# Patient Record
Sex: FEMALE | Race: WHITE
[De-identification: names, ages, dates, MRNs, and addresses within clinical notes are randomized per-mention and may not be internally consistent; named-entity substitution may affect disease eponyms.]

---

## 2018-10-22 ENCOUNTER — HOSPITAL ENCOUNTER (OUTPATIENT)
Dept: HOSPITAL 96 - M.CRD | Age: 48
End: 2018-10-22
Payer: COMMERCIAL

## 2018-10-22 DIAGNOSIS — R47.9: ICD-10-CM

## 2018-10-22 DIAGNOSIS — G45.9: Primary | ICD-10-CM

## 2018-10-22 DIAGNOSIS — F41.9: ICD-10-CM

## 2018-10-22 DIAGNOSIS — I67.82: ICD-10-CM

## 2018-10-22 DIAGNOSIS — G43.911: ICD-10-CM

## 2018-10-22 DIAGNOSIS — I37.1: ICD-10-CM

## 2018-10-22 DIAGNOSIS — I07.1: ICD-10-CM

## 2018-10-22 DIAGNOSIS — I63.9: ICD-10-CM

## 2018-10-22 NOTE — 2DMMODE
[place] [address]
Phone:  [phone] 2 D/M-MODE ECHOCARDIOGRAM     
_______________________________________________________________________________
 
Name:         TEREZA SILVER                Room:                     REG CLI
M.R.#:    [id number]     Account #:     [id number]  
Admission:    10/22/18    Attend Phys:   Hakan Hester MD 
Discharge:                Date of Birth: [birth year]  
Date of Service: 10/22/18 1410  Report #:      [id number]
        [id number]
_______________________________________________________________________________
THIS REPORT FOR:  //name//                      
 
 
--------------- APPROVED REPORT --------------
 
 
Study performed:  10/22/2018 08:45:45
 
EXAM: Comprehensive 2D, Doppler, and color-flow 
Echocardiogram 
Patient Location: Out-Patient   
      Status:  routine
 
      BSA:         1.86
HR: 65 bpm BP:          116/60 mmHg 
 
Other Information 
Study Quality: Good
 
Indications
CVA/TIA 
Migraine
 
Echo Enhancing Agent
Indication: Rule out Shunt
Agent(s) / Amount(s) Used: Agitated Saline 10 cc
 
2D Dimensions
IVSd:  8.96 (7-11mm) LVOT Diam:  20.08 (18-24mm) 
LVDd:  43.78 mm  
PWd:  8.96 (7-11mm) Ascending Ao:  26.64 (22-36mm)
LVDs:  28.53 (25-40mm) 
Aortic Root:  25.30 mm 
 
Volumes
Left Atrial Volume (Systole) 
    LA ESV Index:  17.40 mL/m2
 
Aortic Valve
AoV Peak Marek.:  1.52 m/s 
AO Peak Gr.:  9.19 mmHg  LVOT Max P.15 mmHg
AO Mean Gr.:  4.70 mmHg  LVOT Mean P.63 mmHg
    LVOT Max V:  1.24 m/s
AO V2 VTI:  29.03 cm  LVOT Mean V:  0.72 m/s
JOSSELIN (VTI):  2.76 cm2  LVOT V1 VTI:  25.29 cm
 
 
 
[place] [address]
Phone:  [phone]                     2 D/M-MODE ECHOCARDIOGRAM     
_______________________________________________________________________________
 
Name:         TEREZA SILVER                Room:                     REG CLI
M.R.#:    [id number]     Account #:     [id number]  
Admission:    10/22/18    Attend Phys:   Hakan Hester MD 
Discharge:                Date of Birth: [birth year]  
Date of Service: 10/22/18 1410  Report #:      [id number]
        [id number]
_______________________________________________________________________________
Mitral Valve
    E/A Ratio:  1.18
    MV Decel. Time:  190.62 ms
MV E Max Marek.:  0.69 m/s 
MV PHT:  55.28 ms  
MVA (PHT):  3.98 cm2  
 
TDI
E/Lateral E':  3.83 E/Medial E':  6.27
   Medial E' Marek.:  0.11 m/s
   Lateral E' Marek.:  0.18 m/s
 
Pulmonary Valve
PV Peak Marek.:  0.89 m/s PV Peak Gr.:  3.18 mmHg
 
Tricuspid Valve
    RAP Estimate:  5.00 mmHg
TR Peak Gr.:  23.11 mmHg RVSP:  28.11 mmHg
    PA Pressure:  28.11 mmHg
 
Left Ventricle
The left ventricle is normal size. There is normal LV segmental wall 
motion. There is normal left ventricular wall thickness. Left 
ventricular systolic function is normal. The left ventricular 
ejection fraction is within the normal range. LVEF is 55-60%. The 
left ventricular diastolic function is normal.
 
Right Ventricle
The right ventricle is normal size. The right ventricular systolic 
function is normal.
 
Atria
The left atrium size is normal. Injection of bubbles documented no 
interatrial shunt. The right atrium size is normal.
 
Aortic Valve
The aortic valve is normal in structure. No aortic regurgitation is 
present. There is no aortic valvular stenosis.
 
Mitral Valve
The mitral valve is normal in structure. Trace mitral regurgitation. 
No evidence of mitral valve stenosis.
 
Tricuspid Valve
The tricuspid valve is normal in structure. Mild tricuspid 
regurgitation.
 
 
[place] [address]
Phone:  [phone]                     2 D/M-MODE ECHOCARDIOGRAM     
_______________________________________________________________________________
 
Name:         TEREZA SILVER                Room:                     REG CLI
M.R.#:    [id number]     Account #:     [id number]  
Admission:    10/22/18    Attend Phys:   Hakan Hester MD 
Discharge:                Date of Birth: [birth year]  
Date of Service: 10/22/18 1410  Report #:      [id number]
        [id number]
_______________________________________________________________________________
 
Pulmonic Valve
The pulmonary valve is normal in structure. Mild pulmonic 
regurgitation.
 
Great Vessels
The aortic root is normal in size. IVC is normal in size and 
collapses &gt;50% with inspiration.
 
Pericardium
There is no pericardial effusion.
 
&lt;Conclusion&gt;
Left ventricular systolic function is normal.
The left ventricular ejection fraction is within the normal 
range.
Injection of bubbles documented no interatrial shunt.
 
 
 
 
 
 
 
 
 
 
 
 
 
 
 
 
 
 
 
 
 
 
 
 
 
 
 
  <ELECTRONICALLY SIGNED>
                                           By: Ryder Blackman MD, FACC      
  10/22/18     1410
D: 10/22/18 1410   _____________________________________
T: 10/22/18 1410   Ryder Blackman MD, FACC        /INF

## 2018-10-26 NOTE — EEG
[address]  [address]                    EEG STUDY REPORT              
_______________________________________________________________________________
 
Name:       TEREZA SILVER                 Room:                      REG CLI 
M.R.#:  [id number]      Account #:      [id number]  
Admission:  10/22/18     Attend Phys:    Hakan Hester MD    
Discharge:               Date of Birth:  [birth year]  
         Report #: [id number]
                                                                     [id number]  
_______________________________________________________________________________
THIS REPORT FOR:  //name//                      
 
CC: AMBREEN Hester
 
DATE OF SERVICE:  10/22/2018
 
 
This patient is being evaluated for migraines and speech difficulty.
 
EEG was done by placing the electrodes by standard 10-20 system of electrode
placement.  Both referential and sequential montages were used for recording. 
Background activity appeared to be about 11 Hz and 40 microvolt.  This is a
symmetrical activity.  The patient went to sleep and that is associated with
bilaterally symmetrical sleep spindle and vertex sharp waves.  Throughout the
record, no active epileptiform activity was noticed.
 
IMPRESSION:  This patient's EEG is within normal limits.
 
Thank you very much for this referral.
 
 
 
 
 
 
 
 
 
 
 
 
 
 
 
 
 
 
 
 
 
 
 
 
<ELECTRONICALLY SIGNED>
                                        By:  Hakan Hester MD             
10/26/18     1914
D: 10/22/18 1717_______________________________________
T: 10/22/18 1741Psimone Hester MD                /nt